# Patient Record
Sex: MALE | Race: WHITE | URBAN - METROPOLITAN AREA
[De-identification: names, ages, dates, MRNs, and addresses within clinical notes are randomized per-mention and may not be internally consistent; named-entity substitution may affect disease eponyms.]

---

## 2018-02-13 ENCOUNTER — OFFICE VISIT (OUTPATIENT)
Dept: ORTHOPEDIC SURGERY | Age: 33
End: 2018-02-13

## 2018-02-13 VITALS
DIASTOLIC BLOOD PRESSURE: 71 MMHG | HEIGHT: 67 IN | WEIGHT: 206 LBS | SYSTOLIC BLOOD PRESSURE: 119 MMHG | BODY MASS INDEX: 32.33 KG/M2 | HEART RATE: 70 BPM

## 2018-02-13 DIAGNOSIS — S80.01XA CONTUSION OF RIGHT KNEE, INITIAL ENCOUNTER: ICD-10-CM

## 2018-02-13 DIAGNOSIS — S89.91XA KNEE INJURY, RIGHT, INITIAL ENCOUNTER: Primary | ICD-10-CM

## 2018-02-13 PROCEDURE — 99203 OFFICE O/P NEW LOW 30 MIN: CPT | Performed by: ORTHOPAEDIC SURGERY

## 2018-02-13 RX ORDER — IBUPROFEN 200 MG
200 TABLET ORAL EVERY 6 HOURS PRN
COMMUNITY

## 2018-02-13 NOTE — ADDENDUM NOTE
Encounter addended by: Jamshid Feliz MA on: 2/13/2018 11:36 AM<BR>    Actions taken: Letter status changed

## 2018-02-13 NOTE — PROGRESS NOTES
hours as needed for Pain      cephALEXin (KEFLEX) 500 MG capsule Take 1 capsule by mouth 4 times daily. 40 capsule 0     No current facility-administered medications for this visit. No Known Allergies    Review of Systems:  Pertinent items are noted in HPI. Review of systems from Patient History Form dated 2/13/18 and available in the patient's chart under the Media tab. Physical Examination:     Vital signs:   /71   Pulse 70   Ht 5' 7\" (1.702 m)   Wt 206 lb (93.4 kg)   BMI 32.26 kg/m²    General:  alert, appears stated age, cooperative and no distress   Gait:  Normal. The patient can bear weight on the injured extremity. Right Knee  Alignment:  neutral   ROM:  0 degrees extension to 120 degrees flexion   Bilateral knee   Crepitus:  no   Joint Tenderness: Medial femoral condyle, medial joint line   Effusion:   0 cc   Patellar excursion:  2 of 4 quadrants    Patellar tilt test:  negative   Patellar facet tenderness:  negative medial   negative lateral   Trochlear tenderness:  negative   Patellar apprehension test:  not tested   Lachman test:  negative   Left knee: negative   Anterior drawer test:  negative   Left knee: negative   Pivot shift test:  not tested   Posterior drawer:   negative    Left knee: negative   Varus laxity at 30 degrees:  negative   Left knee: negative   Valgus laxity at 30 degrees:   negative   Left knee: negative   Wallace's test:  negative   Left knee: negative   Faviola's test:  negative   Left knee: negative   Quadriceps atrophy:  none     There is not any cellulitis, lymphedema or cutaneous lesions noted in the lower extremities. Motor exam of the lower extremities show quadriceps, hamstrings, foot dorsiflexion and plantarflexion grossly intact. Sensation to both feet is grossly intact to light touch. The bilateral lower extremities are warm and well-perfused with brisk capillary refill.       Imaging:  Right Knee X-Ray: Bilateral sunrise and standing PA xrays of the knee were obtained and reviewed. Lateral view from outside reviewed. No fracture and Normal alignment. Maintained joint spces       Assessment:     Right knee contusion      Plan:     Natural history and expected course discussed. Questions answered. Continue Rest, ice, compression, and elevation (RICE) therapy. Discussed taking the NSAID continuously with food for the next two weeks. Afterwards, take prn pain. The patient was advised that NSAID-type medications have two very important potential side effects: gastrointestinal irritation including hemorrhage and renal injuries. He was asked to take the medication with food and to stop if he experiences any GI upset. I asked him to call for vomiting, abdominal pain or black/bloody stools. The patient expresses understanding of these issues and questions were answered. Activity modification. Will hold him off work for one week since he has to squat, climb for work. Follow up next week to see if can return him to full duty.

## 2018-02-20 ENCOUNTER — OFFICE VISIT (OUTPATIENT)
Dept: ORTHOPEDIC SURGERY | Age: 33
End: 2018-02-20

## 2018-02-20 VITALS
SYSTOLIC BLOOD PRESSURE: 113 MMHG | HEIGHT: 67 IN | BODY MASS INDEX: 32.32 KG/M2 | WEIGHT: 205.91 LBS | DIASTOLIC BLOOD PRESSURE: 71 MMHG | RESPIRATION RATE: 17 BRPM | HEART RATE: 68 BPM

## 2018-02-20 DIAGNOSIS — S80.01XD CONTUSION OF RIGHT KNEE, SUBSEQUENT ENCOUNTER: Primary | ICD-10-CM

## 2018-02-20 PROCEDURE — 99213 OFFICE O/P EST LOW 20 MIN: CPT | Performed by: ORTHOPAEDIC SURGERY

## 2018-02-20 NOTE — PROGRESS NOTES
lateral   Trochlear tenderness:  negative   Patellar apprehension test:  not tested   Lachman test:  negative   Left knee: negative   Anterior drawer test:  negative   Left knee: negative   Pivot shift test:  not tested   Posterior drawer:   negative    Left knee: negative   Varus laxity at 30 degrees:  negative   Left knee: negative   Valgus laxity at 30 degrees:   negative   Left knee: negative   Wallace's test:  positive   Left knee: negative   Faviola's test:  negative   Left knee: negative   Quadriceps atrophy:  none     There is not any cellulitis, lymphedema or cutaneous lesions noted in the lower extremities. Motor exam of the lower extremities show quadriceps, hamstrings, foot dorsiflexion and plantarflexion grossly intact. Sensation to both feet is grossly intact to light touch. The bilateral lower extremities are warm and well-perfused with brisk capillary refill. Imaging:  Right Knee X-Ray (previous): No fracture and Normal alignment. Maintained joint spaces       Assessment:     Right knee contusion      Plan:     His areas of tenderness are different this week compared to last week. Ice prn. NSAIDs prn. Handout with HEP provided. Light duty for 2 weeks. Follow up in 2 weeks to see if can return to full duty.

## 2018-03-05 ENCOUNTER — OFFICE VISIT (OUTPATIENT)
Dept: ORTHOPEDIC SURGERY | Age: 33
End: 2018-03-05

## 2018-03-05 VITALS
HEART RATE: 66 BPM | WEIGHT: 205.91 LBS | HEIGHT: 67 IN | SYSTOLIC BLOOD PRESSURE: 111 MMHG | RESPIRATION RATE: 17 BRPM | BODY MASS INDEX: 32.32 KG/M2 | DIASTOLIC BLOOD PRESSURE: 74 MMHG

## 2018-03-05 DIAGNOSIS — S80.01XD CONTUSION OF RIGHT KNEE, SUBSEQUENT ENCOUNTER: Primary | ICD-10-CM

## 2018-03-05 PROCEDURE — 99213 OFFICE O/P EST LOW 20 MIN: CPT | Performed by: ORTHOPAEDIC SURGERY

## 2018-03-05 NOTE — LETTER
[facility]  [address]  [address] [facility] [address]  Phone: [phone]  Fax: [phone]    Elenita Gonzalez MD        March 5, 2018     Patient: Mechelle Cosby   YOB: 1985   Date of Visit: 3/5/2018       To Whom It May Concern: It is my medical opinion that Mechelle Cosby may return to full duty immediately with no restrictions on 3/12/18. If you have any questions or concerns, please don't hesitate to call.     Sincerely,          Elenita Gonzalez MD